# Patient Record
Sex: FEMALE | Race: WHITE | NOT HISPANIC OR LATINO | URBAN - METROPOLITAN AREA
[De-identification: names, ages, dates, MRNs, and addresses within clinical notes are randomized per-mention and may not be internally consistent; named-entity substitution may affect disease eponyms.]

---

## 2023-04-06 ENCOUNTER — PROBLEM (OUTPATIENT)
Dept: URBAN - METROPOLITAN AREA CLINIC 34 | Facility: CLINIC | Age: 68
End: 2023-04-06

## 2023-04-06 DIAGNOSIS — H04.123: ICD-10-CM

## 2023-04-06 DIAGNOSIS — H10.502: ICD-10-CM

## 2023-04-06 PROCEDURE — 99213 OFFICE O/P EST LOW 20 MIN: CPT

## 2023-04-06 ASSESSMENT — VISUAL ACUITY
OD_SC: 20/20
OS_SC: 20/20

## 2024-04-16 ENCOUNTER — APPOINTMENT (RX ONLY)
Dept: URBAN - METROPOLITAN AREA CLINIC 41 | Facility: CLINIC | Age: 69
Setting detail: DERMATOLOGY
End: 2024-04-16

## 2024-04-16 DIAGNOSIS — Z41.9 ENCOUNTER FOR PROCEDURE FOR PURPOSES OTHER THAN REMEDYING HEALTH STATE, UNSPECIFIED: ICD-10-CM

## 2024-04-16 DIAGNOSIS — L82.0 INFLAMED SEBORRHEIC KERATOSIS: ICD-10-CM | Status: INADEQUATELY CONTROLLED

## 2024-04-16 DIAGNOSIS — L85.3 XEROSIS CUTIS: ICD-10-CM | Status: STABLE

## 2024-04-16 DIAGNOSIS — L57.8 OTHER SKIN CHANGES DUE TO CHRONIC EXPOSURE TO NONIONIZING RADIATION: ICD-10-CM | Status: STABLE

## 2024-04-16 DIAGNOSIS — L81.5 LEUKODERMA, NOT ELSEWHERE CLASSIFIED: ICD-10-CM | Status: STABLE

## 2024-04-16 DIAGNOSIS — D22 MELANOCYTIC NEVI: ICD-10-CM | Status: STABLE

## 2024-04-16 DIAGNOSIS — L82.1 OTHER SEBORRHEIC KERATOSIS: ICD-10-CM | Status: STABLE

## 2024-04-16 DIAGNOSIS — D18.0 HEMANGIOMA: ICD-10-CM | Status: STABLE

## 2024-04-16 DIAGNOSIS — B07.8 OTHER VIRAL WARTS: ICD-10-CM | Status: INADEQUATELY CONTROLLED

## 2024-04-16 PROBLEM — D18.01 HEMANGIOMA OF SKIN AND SUBCUTANEOUS TISSUE: Status: ACTIVE | Noted: 2024-04-16

## 2024-04-16 PROBLEM — D23.72 OTHER BENIGN NEOPLASM OF SKIN OF LEFT LOWER LIMB, INCLUDING HIP: Status: ACTIVE | Noted: 2024-04-16

## 2024-04-16 PROBLEM — D23.71 OTHER BENIGN NEOPLASM OF SKIN OF RIGHT LOWER LIMB, INCLUDING HIP: Status: ACTIVE | Noted: 2024-04-16

## 2024-04-16 PROBLEM — D22.5 MELANOCYTIC NEVI OF TRUNK: Status: ACTIVE | Noted: 2024-04-16

## 2024-04-16 PROCEDURE — ? COSMETIC CONSULTATION: GENERAL

## 2024-04-16 PROCEDURE — ? COUNSELING

## 2024-04-16 PROCEDURE — ? COSMETIC CONSULTATION: SKIN TIGHTENING

## 2024-04-16 PROCEDURE — ? PRESCRIPTION MEDICATION MANAGEMENT

## 2024-04-16 PROCEDURE — ? PRESCRIPTION

## 2024-04-16 PROCEDURE — ? BENIGN DESTRUCTION

## 2024-04-16 PROCEDURE — 99203 OFFICE O/P NEW LOW 30 MIN: CPT | Mod: 25

## 2024-04-16 PROCEDURE — ? TREATMENT REGIMEN

## 2024-04-16 PROCEDURE — ? FULL BODY SKIN EXAM

## 2024-04-16 PROCEDURE — 17110 DESTRUCTION B9 LES UP TO 14: CPT

## 2024-04-16 RX ORDER — TRETIONIN 0.25 MG/G
CREAM TOPICAL
Qty: 45 | Refills: 3 | COMMUNITY
Start: 2024-04-16

## 2024-04-16 RX ADMIN — TRETIONIN: 0.25 CREAM TOPICAL at 00:00

## 2024-04-16 ASSESSMENT — LOCATION SIMPLE DESCRIPTION DERM
LOCATION SIMPLE: RIGHT PRETIBIAL REGION
LOCATION SIMPLE: LEFT UPPER BACK
LOCATION SIMPLE: RIGHT SHOULDER
LOCATION SIMPLE: INFERIOR FOREHEAD
LOCATION SIMPLE: LEFT FOREARM
LOCATION SIMPLE: RIGHT FOREHEAD
LOCATION SIMPLE: RIGHT FOREARM
LOCATION SIMPLE: LEFT PRETIBIAL REGION
LOCATION SIMPLE: LEFT SHOULDER
LOCATION SIMPLE: ABDOMEN
LOCATION SIMPLE: RIGHT CHEEK
LOCATION SIMPLE: RIGHT UPPER BACK
LOCATION SIMPLE: LEFT LOWER BACK
LOCATION SIMPLE: LEFT HAND
LOCATION SIMPLE: UPPER BACK

## 2024-04-16 ASSESSMENT — LOCATION ZONE DERM
LOCATION ZONE: ARM
LOCATION ZONE: HAND
LOCATION ZONE: FACE
LOCATION ZONE: TRUNK
LOCATION ZONE: LEG

## 2024-04-16 ASSESSMENT — LOCATION DETAILED DESCRIPTION DERM
LOCATION DETAILED: RIGHT PROXIMAL DORSAL FOREARM
LOCATION DETAILED: RIGHT LATERAL ABDOMEN
LOCATION DETAILED: RIGHT INFERIOR LATERAL FOREHEAD
LOCATION DETAILED: INFERIOR MID FOREHEAD
LOCATION DETAILED: LEFT DISTAL PRETIBIAL REGION
LOCATION DETAILED: INFERIOR THORACIC SPINE
LOCATION DETAILED: LEFT MID-UPPER BACK
LOCATION DETAILED: LEFT PROXIMAL DORSAL FOREARM
LOCATION DETAILED: LEFT INFERIOR MEDIAL MIDBACK
LOCATION DETAILED: LEFT HYPOTHENAR EMINENCE
LOCATION DETAILED: RIGHT SUPERIOR PREAURICULAR CHEEK
LOCATION DETAILED: RIGHT MID-UPPER BACK
LOCATION DETAILED: LEFT POSTERIOR SHOULDER
LOCATION DETAILED: RIGHT POSTERIOR SHOULDER
LOCATION DETAILED: RIGHT PROXIMAL PRETIBIAL REGION

## 2024-04-16 NOTE — HPI: EVALUATION OF SKIN LESION(S)
Hpi Title: Evaluation of Skin Lesions
Additional History: New pt here. Pt presents for FBSE today. Pt notes this is her first FBSE. Pt denies any specific lesion of concern \\n\\nPt denies any personal skin cancer hx

## 2024-04-16 NOTE — PROCEDURE: BENIGN DESTRUCTION
Include Z78.9 (Other Specified Conditions Influencing Health Status) As An Associated Diagnosis?: No
Medical Necessity Clause: This procedure was medically necessary because the lesions that were treated were:
Post-Care Instructions: I reviewed with the patient in detail post-care instructions. Patient is to wear sunprotection, and avoid picking at any of the treated lesions. Pt may apply Vaseline to crusted or scabbing areas.
Detail Level: Detailed
Anesthesia Volume In Cc: 0.5
Medical Necessity Information: It is in your best interest to select a reason for this procedure from the list below. All of these items fulfill various CMS LCD requirements except the new and changing color options.
Treatment Number (Will Not Render If 0): 0
Consent: The patient's consent was obtained including but not limited to risks of crusting, scabbing, blistering, scarring, darker or lighter pigmentary change, recurrence, incomplete removal and infection.

## 2024-04-16 NOTE — PROCEDURE: COUNSELING
Detail Level: Generalized
Sunscreen Recommendations: spf 30+
Detail Level: Detailed
Patient Specific Counseling (Will Not Stick From Patient To Patient): -\\nLC quotes $200 per session for LHR. LC discusses she can perform laser tx on dark hairs but not any light or gray hairs. LC recommends if pt would like to pursue LHR then to trim hairs before appt \\n\\nPT notes she has had electrolysis previously

## 2024-04-26 ENCOUNTER — APPOINTMENT (RX ONLY)
Dept: URBAN - METROPOLITAN AREA CLINIC 41 | Facility: CLINIC | Age: 69
Setting detail: DERMATOLOGY
End: 2024-04-26

## 2024-04-26 DIAGNOSIS — Z41.9 ENCOUNTER FOR PROCEDURE FOR PURPOSES OTHER THAN REMEDYING HEALTH STATE, UNSPECIFIED: ICD-10-CM

## 2024-04-26 PROCEDURE — ? COSMETIC CONSULTATION: LASER RESURFACING

## 2024-04-26 PROCEDURE — ? COSMETIC CONSULTATION: LHR

## 2024-04-26 PROCEDURE — ? COSMETIC CONSULTATION: BOTOX

## 2024-04-26 PROCEDURE — ? COSMETIC CONSULTATION: FILLERS

## 2024-04-26 PROCEDURE — ? ADDITIONAL NOTES

## 2024-04-26 NOTE — HPI: COSMETIC CONSULTATION
Additional History: Est pt here for cosmetic consultation on sun spots, skin tightening, Botox, laser hair removal

## 2024-04-26 NOTE — PROCEDURE: ADDITIONAL NOTES
Render Risk Assessment In Note?: no
Detail Level: Simple
Additional Notes: EG notes that she can see pt moves the left side of her forehead/brow more than the right side.\\n\\nEG counsels pt on Proscriptix sunscreen with hyaluronic acid and tint.\\n\\nPt asks about why she gets headaches from Botox. EG counsels that there are two different kinds of lines, lines at motion and lines at rest and Botox prevents lines at motion. Botox does not erase lines at rest. Botox can help at any age. EG counsels that relaxing the muscle will not lift the eyebrow. \\n\\nEG counsels pt on other Botox options in glabella and crows feet. EG notes brianna o lantern eyebrow possibility, and forehead can be treated afterwards if that occurs.\\n\\nPt asks about marionette lines. EG counsels pt on volume deficiency with age and how fillers are treatments for the lack of volume in those areas. EG counsels that filler lasts for a time dependent on several things, such as age, body’s metabolism, if pt has had filler before. \\nEG notices volume loss in the temples, cheeks, around the jawline, piriform, NLF, marionette.\\n\\nPt notes concern of scarring from needles. EG notes she’s never experienced scars on her patients with injections. EG notes needles are very small. \\n\\nPt asks about laser treatments that rejuvenate the skin and sun spots. Pt asks about radio frequency lasers that the  told her about.  EG counsels for brown spots, if main concern, she would suggest YAG. \\n\\nEG quotes $2000 for VIVA package, 4 treatments a month apart from each other. Individual treatments are $650. Doesn’t have to be exactly a month. EG notes she can’t treat eye/eyelid with VIVA. Pt asks how long results last. EG notes that the lasting length of results depends on how fast pt ages and breaks through collagen. Pt asks about scarring from laser. EG notes that she is not concerned with scarring from the applicator tip because we are not actually piercing the skin. Viva helps with brightening, fine lines, and wrinkles. \\n\\nEG counsels pt on YAG laser for individual brown spots. EG counsels she wouldn’t do YAG laser until after the VIVA laser if needed. There is a risk with skin darkening or lightening with YAG. \\n\\nEG quotes $792 for 44 units of Botox in glabella and crows feet. \\nPt notes concerning with headaches from Botox. EG counsels that depth of Botox is not the issue. EG notes that sometimes tension headaches are something that we see as a side effect. The side effects usually goes away once the Botox sets in. EG notes she cannot promise no headaches while muscle is trying to relax and we naturally squeeze it. EG notes keeping up Botox every 3 to 4 months can help reduce tension headaches. \\nPt notes she is afraid of Botox. She is worried about the headaches. \\n\\nEG counsels pt on a different toxin, Dysport. Pt could try a different toxin to see if she won’t have headaches with this one because they are different proteins, but the same active ingredients. Pt would wait 2 weeks after Dysport injection before getting VIVA laser. \\n\\nPt notes concern with hair on mustache and chin. EG notes that blond hairs won’t respond to the treatment but darker coarse hairs will. EG notes we start with one treatment every 4 weeks and suggests 6 treatments. EG quotes upper lip $350 per tx. Pt has had electrolysis before, EG notes that that may reduce the need for 6 treatments, but suggests starting with 3 treatments.\\n\\nSeborrheic Keratoses: Pt asks about dark spots on sides of face and notes these spots arose after pimples. EG notes that there is no know correlation of SKs and pimples

## 2024-05-23 ENCOUNTER — ESTABLISHED COMPREHENSIVE EXAM (OUTPATIENT)
Dept: URBAN - METROPOLITAN AREA CLINIC 34 | Facility: CLINIC | Age: 69
End: 2024-05-23

## 2024-05-23 DIAGNOSIS — H10.532: ICD-10-CM

## 2024-05-23 DIAGNOSIS — H25.13: ICD-10-CM

## 2024-05-23 DIAGNOSIS — H04.123: ICD-10-CM

## 2024-05-23 PROCEDURE — 92014 COMPRE OPH EXAM EST PT 1/>: CPT

## 2024-05-23 ASSESSMENT — VISUAL ACUITY
OD_SC: 20/15
OU_CC: J1+
OS_SC: 20/20-1

## 2024-05-23 ASSESSMENT — TONOMETRY
OS_IOP_MMHG: 16
OD_IOP_MMHG: 14

## 2025-07-16 ENCOUNTER — ESTABLISHED COMPREHENSIVE EXAM (OUTPATIENT)
Dept: URBAN - METROPOLITAN AREA CLINIC 93 | Facility: CLINIC | Age: 70
End: 2025-07-16

## 2025-07-16 DIAGNOSIS — H04.123: ICD-10-CM

## 2025-07-16 DIAGNOSIS — H25.13: ICD-10-CM

## 2025-07-16 PROCEDURE — 92014 COMPRE OPH EXAM EST PT 1/>: CPT

## 2025-07-16 ASSESSMENT — TONOMETRY
OD_IOP_MMHG: 14
OS_IOP_MMHG: 14

## 2025-07-16 ASSESSMENT — VISUAL ACUITY
OU_CC: J1+
OD_SC: 20/20
OS_SC: 20/20